# Patient Record
(demographics unavailable — no encounter records)

---

## 2025-06-03 NOTE — HISTORY OF PRESENT ILLNESS
[TextBox_4] : 74 yo F with PMHx of PBC, CAD SP PCI presents for sleep apnea evaluation at advice of Cardiologist Dr. Hoskins. Denies respiratory complaints.   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

## 2025-06-05 NOTE — PHYSICAL EXAM
[Chaperoned Physical Exam] : A chaperone was present in the examining room during all aspects of the physical examination. [MA] : MA [Examination Of The Breasts] : a normal appearance [No Masses] : no breast masses were palpable [FreeTextEntry2] : YUKI [Labia Majora] : normal [Labia Minora] : normal [Normal] : normal [Uterine Adnexae] : normal

## 2025-06-05 NOTE — PROCEDURE
[Abnormal Uterine Bleeding] : abnormal uterine bleeding [Transvaginal Ultrasound] : transvaginal ultrasound [FreeTextEntry3] : POST MENOPAUSAL BLEEDING SM MYOMA CERVIX NORMALNO FREE FLUID [FreeTextEntry5] : 24 CC VOL1 MM...SM MYOMA: 0.9 CC AT FUNDUS [FreeTextEntry7] : 0.4 CC [FreeTextEntry8] : 0.4 CC

## 2025-06-05 NOTE — HISTORY OF PRESENT ILLNESS
[FreeTextEntry1] : HERE FOR HER ANNUAL HAS PINK STAINING X 2 WEEKS HAD A SYMPHION PROCEDURE 4 YEARS AGO....ALL BENIGN